# Patient Record
Sex: FEMALE
[De-identification: names, ages, dates, MRNs, and addresses within clinical notes are randomized per-mention and may not be internally consistent; named-entity substitution may affect disease eponyms.]

---

## 2021-05-30 ENCOUNTER — TRANSCRIPTION ENCOUNTER (OUTPATIENT)
Age: 36
End: 2021-05-30

## 2022-10-20 PROBLEM — Z00.00 ENCOUNTER FOR PREVENTIVE HEALTH EXAMINATION: Status: ACTIVE | Noted: 2022-10-20

## 2022-10-24 ENCOUNTER — APPOINTMENT (OUTPATIENT)
Dept: RHEUMATOLOGY | Facility: CLINIC | Age: 37
End: 2022-10-24

## 2022-10-24 DIAGNOSIS — E27.40 UNSPECIFIED ADRENOCORTICAL INSUFFICIENCY: ICD-10-CM

## 2022-10-24 DIAGNOSIS — R76.8 OTHER SPECIFIED ABNORMAL IMMUNOLOGICAL FINDINGS IN SERUM: ICD-10-CM

## 2022-10-24 DIAGNOSIS — D56.3 THALASSEMIA MINOR: ICD-10-CM

## 2022-10-24 DIAGNOSIS — J45.909 UNSPECIFIED ASTHMA, UNCOMPLICATED: ICD-10-CM

## 2022-10-24 DIAGNOSIS — H15.009 UNSPECIFIED SCLERITIS, UNSPECIFIED EYE: ICD-10-CM

## 2022-10-24 DIAGNOSIS — Z83.79 FAMILY HISTORY OF OTHER DISEASES OF THE DIGESTIVE SYSTEM: ICD-10-CM

## 2022-10-24 DIAGNOSIS — R79.89 OTHER SPECIFIED ABNORMAL FINDINGS OF BLOOD CHEMISTRY: ICD-10-CM

## 2022-10-24 DIAGNOSIS — Z78.9 OTHER SPECIFIED HEALTH STATUS: ICD-10-CM

## 2022-10-24 DIAGNOSIS — E07.9 DISORDER OF THYROID, UNSPECIFIED: ICD-10-CM

## 2022-10-24 DIAGNOSIS — Z86.2 PERSONAL HISTORY OF DISEASES OF THE BLOOD AND BLOOD-FORMING ORGANS AND CERTAIN DISORDERS INVOLVING THE IMMUNE MECHANISM: ICD-10-CM

## 2022-10-24 DIAGNOSIS — Z82.49 FAMILY HISTORY OF ISCHEMIC HEART DISEASE AND OTHER DISEASES OF THE CIRCULATORY SYSTEM: ICD-10-CM

## 2022-10-24 DIAGNOSIS — L40.9 PSORIASIS, UNSPECIFIED: ICD-10-CM

## 2022-10-24 DIAGNOSIS — H15.109 UNSPECIFIED SCLERITIS, UNSPECIFIED EYE: ICD-10-CM

## 2022-10-24 DIAGNOSIS — E53.8 DEFICIENCY OF OTHER SPECIFIED B GROUP VITAMINS: ICD-10-CM

## 2022-10-24 PROCEDURE — 99204 OFFICE O/P NEW MOD 45 MIN: CPT | Mod: 95

## 2022-10-24 RX ORDER — PREDNISONE 5 MG/1
5 TABLET ORAL
Qty: 90 | Refills: 0 | Status: DISCONTINUED | COMMUNITY
Start: 2022-05-13

## 2022-10-24 RX ORDER — ASCORBIC ACID 500 MG
TABLET,CHEWABLE ORAL
Refills: 0 | Status: ACTIVE | COMMUNITY

## 2022-10-24 RX ORDER — LOTEPREDNOL ETABONATE 5 MG/G
0.5 OINTMENT OPHTHALMIC
Qty: 4 | Refills: 0 | Status: DISCONTINUED | COMMUNITY
Start: 2022-05-13

## 2022-10-24 RX ORDER — ASCORBIC ACID
CRYSTALS ORAL
Refills: 0 | Status: ACTIVE | COMMUNITY

## 2022-10-24 RX ORDER — CEPHALEXIN 500 MG/1
500 CAPSULE ORAL
Qty: 56 | Refills: 0 | Status: DISCONTINUED | COMMUNITY
Start: 2022-08-28

## 2022-10-24 RX ORDER — AMPICILLIN TRIHYDRATE 500 MG
CAPSULE ORAL
Refills: 0 | Status: ACTIVE | COMMUNITY

## 2022-10-24 RX ORDER — PRENATAL VIT 49/IRON FUM/FOLIC 6.75-0.2MG
TABLET ORAL
Refills: 0 | Status: ACTIVE | COMMUNITY

## 2022-10-24 NOTE — REASON FOR VISIT
[Initial Evaluation] : an initial evaluation [FreeTextEntry1] : Evaluation for possible underlying rheumatologic etiology of episcleritis/scleritis

## 2022-10-24 NOTE — ASSESSMENT
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 Episcleritis and scleritis may be a/w a variety of rheumatologic conditions including SLE, rheumatoid arthritis, ANCA vasculitis, reactive arthritis, relapsing polychondritis; also infections including Lyme, TB, syphilis, and herpes Zoster.\par - f/u labs for RA, SLE, ANCA vasculitis, syphilis, TB, Lyme, herpes, also HLA B27

## 2022-10-24 NOTE — HISTORY OF PRESENT ILLNESS
[FreeTextEntry1] : Last week, pt wore her contact lenses for a prolonged period, and a few days later she developed L eye redness and irritation. She went to see an ophthalmologist and was found to have episcleritis, and possibly also early scleritis. She has been taking NSAIDs for the past few days with resolution of her symptoms. She also reports an episode of similar L eye redness and irritation a few months ago that improved on its own after a few days. + Intermittent R eyelid swelling since high school which she feels has been worse over the past few months. + Eczema versus psoriasis, never had it evaluated. She has been feeling well otherwise; she just returned from a trip to [place]. Denies fevers, joint pain or swelling, bloody diarrhea, back pain, pain with urination, SOB, cough, swelling elsewhere aside from her eyelid, Raynaud's, hair loss, oral ulcers, blood clots, nose or ear pain or swelling, miscarriages.\par \par + Family h/o IBD (cousin); pt's mom also has Raynaud's and may have had an episode of uveitis in the past?